# Patient Record
(demographics unavailable — no encounter records)

---

## 2024-11-08 NOTE — PHYSICAL EXAM
[Rinne Test Air Conduction Persists > Bone Conduction Right] : air conduction greater than bone conduction on the right [Rinne Test Air Conduction Persists > Bone Conduction Left] : air conduction greater than bone conduction on the left [Hearing Hathaway Test (Tuning Fork On Forehead)] : no lateralization of tone [] : septum deviated bilaterally [Midline] : trachea located in midline position [Normal] : no rashes [de-identified] : submandibular glands are large, but soft and symmetric [Hearing Loss Right Only] : normal [Hearing Loss Left Only] : normal [FreeTextEntry8] :  cerumen impaction removed via curettage [de-identified] : dry intranasally [de-identified] :  mildly inflamed turbinates [de-identified] : class 2 [de-identified] : type 3 oral cavity. large, edematous uvula [FreeTextEntry2] :  sinuses nontender to percussion.  sensations intact

## 2024-11-08 NOTE — CONSULT LETTER
[Dear  ___] : Dear  [unfilled], [Consult Letter:] : I had the pleasure of evaluating your patient, [unfilled]. [Please see my note below.] : Please see my note below. [Consult Closing:] : Thank you very much for allowing me to participate in the care of this patient.  If you have any questions, please do not hesitate to contact me. [Sincerely,] : Sincerely, [FreeTextEntry3] :  Armin White MD FACS

## 2024-11-08 NOTE — ASSESSMENT
[FreeTextEntry1] : Reviewed and reconciled medications, allergies, PMHx, PSHx, SocHx, FMHx.  Patient presents today stating that he wants an ear cleaning. He denies feeling a clogged sensation in his ears. He denies hearing loss or tinnitus. He denies a family history of hearing loss besides his Mom. He states that he snores loudly at night, but he denies witnessed pauses in his breathing. He denies feeling tired in the mornings.   Physical exam: -NOSE score 0 -TNSS 0 -right ear canal: cerumen impaction removed via curettage -left ear canal: cerumen impaction removed via curettage -no lateralization to tuning forks -Air>Bone on Rinnie's bilaterally -deviated septum bilaterally L>R -mildly inflamed turbinates -dry intranasally -class 2 tonsils -type 3 oral cavity -large, edematous uvula -submandibular glands are large, but soft and symmetric   Plan: -FU in 8 months

## 2024-11-08 NOTE — ADDENDUM
[FreeTextEntry1] :  Documented by Jeromy Hess acting as scribe for Dr. White on 11/08/2024. All Medical record entries made by the Scribe were at my, Dr. White, direction and personally dictated by me on 11/08/2024 . I have reviewed the chart and agree that the record accurately reflects my personal performance of the history, physical exam, assessment and plan. I have also personally directed, reviewed, and agreed with the discharge instructions.

## 2024-11-08 NOTE — HISTORY OF PRESENT ILLNESS
[de-identified] : Patient presents today stating that he wants an ear cleaning. He denies feeling a clogged sensation in his ears. He denies hearing loss or tinnitus. He denies a family history of hearing loss besides his Mom. He states that he snores loudly at night, but he denies witnessed pauses in his breathing. He denies feeling tired in the mornings.